# Patient Record
Sex: FEMALE | Race: AMERICAN INDIAN OR ALASKA NATIVE
[De-identification: names, ages, dates, MRNs, and addresses within clinical notes are randomized per-mention and may not be internally consistent; named-entity substitution may affect disease eponyms.]

---

## 2017-12-20 ENCOUNTER — HOSPITAL ENCOUNTER (OUTPATIENT)
Dept: HOSPITAL 5 - MAMMO | Age: 57
Discharge: HOME | End: 2017-12-20
Attending: FAMILY MEDICINE
Payer: COMMERCIAL

## 2017-12-20 DIAGNOSIS — Z12.31: Primary | ICD-10-CM

## 2017-12-20 PROCEDURE — 77067 SCR MAMMO BI INCL CAD: CPT

## 2017-12-20 PROCEDURE — G0202 SCR MAMMO BI INCL CAD: HCPCS

## 2017-12-20 NOTE — MAMMOGRAPHY REPORT
BILATERAL DIGITAL SCREENING MAMMOGRAM with CAD: 12/20/17 08:18:00



CLINICAL: Routine screening.



COMPARISON:None available.  Her previous mammogram was in [facility].  



FINDINGS: The breasts are mostly fatty.A left upper outer asymmetry 

with architectural distortion requires additional imaging.  No mass or 

suspicious calcifications.  The right breast is negative.



IMPRESSION: Left asymmetry and architectural distortion requiring 

further workup.



BI-RADS CATEGORY:  0 -- Additional Imaging Evaluation Required



RECOMMENDATION: Recall for left mediolateral , spot magnification  CC 

and MLO views and left breast ultrasound if needed.



ACR BI-RADS MAMMOGRAPHIC CODES:

0 = Needs additional imaging evaluation; 1 = Negative; 2 = Benign; 3 = 

Probably benign; 4 = Suspicious; 5 = Malignant; 6 = Known biopsy-proven 

malignancy



COMMENT:

      1.   Dense breast tissue, i.e., adenosis, fibrocystic 

            changes, etc., may obscure an underlying neoplasm.

      2.   Approximately 10% of cancers are not detected with

            mammography.

      3.   A negative mammography report should not delay biopsy 

            if a clinically suspicious mass is present.



COMMENT:

Patient follow-up letters are generated via our [employer] application.

## 2018-05-04 ENCOUNTER — HOSPITAL ENCOUNTER (OUTPATIENT)
Dept: HOSPITAL 5 - GIO | Age: 58
Discharge: HOME | End: 2018-05-04
Attending: INTERNAL MEDICINE
Payer: COMMERCIAL

## 2018-05-04 VITALS — DIASTOLIC BLOOD PRESSURE: 92 MMHG | SYSTOLIC BLOOD PRESSURE: 134 MMHG

## 2018-05-04 DIAGNOSIS — K57.30: ICD-10-CM

## 2018-05-04 DIAGNOSIS — K64.8: ICD-10-CM

## 2018-05-04 DIAGNOSIS — Z12.11: Primary | ICD-10-CM

## 2018-05-04 DIAGNOSIS — I10: ICD-10-CM

## 2018-05-04 DIAGNOSIS — K63.5: ICD-10-CM

## 2018-05-04 PROCEDURE — 45385 COLONOSCOPY W/LESION REMOVAL: CPT

## 2018-05-04 PROCEDURE — 81025 URINE PREGNANCY TEST: CPT

## 2018-05-04 PROCEDURE — 45384 COLONOSCOPY W/LESION REMOVAL: CPT

## 2018-05-04 PROCEDURE — 45388 COLONOSCOPY W/ABLATION: CPT

## 2018-05-04 PROCEDURE — 45381 COLONOSCOPY SUBMUCOUS NJX: CPT

## 2018-05-04 PROCEDURE — 88305 TISSUE EXAM BY PATHOLOGIST: CPT

## 2018-05-04 NOTE — ANESTHESIA CONSULTATION
Anesthesia Consult and Med Hx


Date of service: 05/04/18





- Airway


Anesthetic Teeth Evaluation: Good


ROM Head & Neck: Adequate


Mental/Hyoid Distance: Adequate


Mallampati Class: Class II


Intubation Access Assessment: Probably Good





- Pulmonary Exam


CTA: Yes





- Cardiac Exam


Cardiac Exam: RRR





- Pre-Operative Health Status


ASA Pre-Surgery Classification: ASA2


Proposed Anesthetic Plan: MAC





- Cardiovascular System


Hx Hypertension: Yes

## 2018-05-04 NOTE — DISCHARGE SUMMARY
Short Stay Discharge Plan


Activity: advance as tolerated


Weight Bearing Status: Weight Bear as Tolerated


Diet: regular


Additional Instructions: 


                                                Post Sedation D/C Instructions





When you return home you may resume your regular diet unless otherwise 

directed. 





-Go directly home from the hospital and rest quietly. You may resume normal 

activities tomorrow. 





-Do NOT drive, return to work, operate any machinery or make any important 

personal or business decisions today. 





-Do NOT drink any alcohol or take nerve or sleeping drugs. They add to the 

effects of the medicine still present in your body. 


Follow up with: 


BLANE DUFFY MD [Primary Care Provider] - 7 Days